# Patient Record
Sex: FEMALE | Race: WHITE | NOT HISPANIC OR LATINO | Employment: OTHER | URBAN - NONMETROPOLITAN AREA
[De-identification: names, ages, dates, MRNs, and addresses within clinical notes are randomized per-mention and may not be internally consistent; named-entity substitution may affect disease eponyms.]

---

## 2017-10-10 ENCOUNTER — OFFICE VISIT (OUTPATIENT)
Dept: OBSTETRICS AND GYNECOLOGY | Facility: CLINIC | Age: 72
End: 2017-10-10

## 2017-10-10 VITALS
BODY MASS INDEX: 27.99 KG/M2 | SYSTOLIC BLOOD PRESSURE: 132 MMHG | WEIGHT: 168 LBS | HEIGHT: 65 IN | DIASTOLIC BLOOD PRESSURE: 72 MMHG

## 2017-10-10 DIAGNOSIS — N36.8 URETHRAL PROLAPSE: Primary | ICD-10-CM

## 2017-10-10 DIAGNOSIS — Z85.828 HISTORY OF BASAL CELL CANCER: ICD-10-CM

## 2017-10-10 DIAGNOSIS — R10.2 VULVAR PAIN: ICD-10-CM

## 2017-10-10 PROCEDURE — 99214 OFFICE O/P EST MOD 30 MIN: CPT | Performed by: OBSTETRICS & GYNECOLOGY

## 2017-10-10 RX ORDER — ESTRADIOL 0.1 MG/G
CREAM VAGINAL
Qty: 1 EACH | Refills: 12 | Status: SHIPPED | OUTPATIENT
Start: 2017-10-10

## 2017-10-10 RX ORDER — AMLODIPINE BESYLATE 2.5 MG/1
TABLET ORAL
Refills: 3 | COMMUNITY
Start: 2017-08-10

## 2017-10-10 RX ORDER — FOLIC ACID 1 MG/1
TABLET ORAL
Refills: 1 | COMMUNITY
Start: 2017-09-01

## 2017-10-10 RX ORDER — PREDNISONE 1 MG/1
TABLET ORAL
Refills: 1 | COMMUNITY
Start: 2017-09-01

## 2017-10-10 RX ORDER — TRIAMTERENE AND HYDROCHLOROTHIAZIDE 37.5; 25 MG/1; MG/1
TABLET ORAL
Refills: 3 | COMMUNITY
Start: 2017-09-01

## 2017-10-10 NOTE — PROGRESS NOTES
"Subjective  Chief Complaint   Patient presents with   • Vaginal Pain     Patient advised has a growth in vaginal area, also having \"throbbing pain\" on pubic bone.      Patient is 72 y.o.  here for evaluation of abnormal growth noted in vaginal area.  Pt with history of basal cell ca of vulva; removed  from left labia.  Pt reports having recurrent itching in that area and has had throbbing pain in that area down to the bone.  Pt got mirror and examined herself and noticed growth.  Pt denies any vaginal bleeding, discharge.  Pt with no lesions elsewhere.  Pt denies any urinary symptoms.    History  Past Medical History:   Diagnosis Date   • Arthritis    • Asthma    • Basal cell carcinoma 2011    VULVA   • Heart disease    • History of Papanicolaou smear of cervix 2010    NORMAL    • Hypercholesteremia    • Hypertension    • Urinary incontinence      No current outpatient prescriptions on file prior to visit.     No current facility-administered medications on file prior to visit.      No Known Allergies  Past Surgical History:   Procedure Laterality Date   • VULVA SURGERY Left 2011    EXCISION OF LEFT LABIAL LESION/NODULE (DR AYLEEN AZAR)     Family History   Problem Relation Age of Onset   • Hypertension Father      Social History     Social History   • Marital status:      Spouse name: N/A   • Number of children: N/A   • Years of education: N/A     Social History Main Topics   • Smoking status: Never Smoker   • Smokeless tobacco: Never Used   • Alcohol use No   • Drug use: No   • Sexual activity: No      Comment:       Other Topics Concern   • None     Social History Narrative     Review of Systems  All systems were reviewed and negative except for:  Genitourinary: postivie for  vaginal pain     Objective  Vitals:    10/10/17 1407   BP: 132/72   Weight: 168 lb (76.2 kg)   Height: 65\" (165.1 cm)     Physical Exam:  General Appearance: alert, appears stated age and " cooperative  Head: normocephalic, without obvious abnormality and atraumatic  Eyes: lids and lashes normal, conjunctivae and sclerae normal, no icterus, no pallor, corneas clear and PERRLA  Ears: ears appear intact with no abnormalities noted  Nose: nares normal, septum midline, mucosa normal and no drainage  Neck: suppple, trachea midline and no thyromegaly  Lungs: clear to auscultation, respirations regular, respirations even and respirations unlabored  Heart: regular rhythm and normal rate, normal S1, S2, no murmur, gallop, or rubs and no click  Breasts: Not performed.  Abdomen: normal bowel sounds, no masses, no hepatomegaly, no splenomegaly, soft non-tender, no guarding and no rebound tenderness  Pelvic: Clinical staff was present for exam  External genitalia:  normal appearance of the external genitalia including Bartholin's and Brimson's glands. no skin lesions noted; +bilateral varicosities noted of vulva  :  urethral meatus normal; prolapse noted  Vaginal:  atrophic mucosal changes are present;  Cervix:  normal appearance.  Uterus:  normal size, shape and consistency.  Adnexa:  normal bimanual exam of the adnexa.  Extremities: moves extremities well, no edema, no cyanosis and no redness  Skin: no bleeding, bruising or rash and no lesions noted  Lymph Nodes: no palpable adenopathy  Psych: normal mood and affect, oriented to person, time and place, thought content organized and appropriate judgment    Lab Review   No data reviewed    Imaging   No data reviewed    Assessment/Plan    Problem List Items Addressed This Visit     None      Visit Diagnoses     Urethral prolapse    -  Primary  Patient with prolapse of urethra.  Options discussed with patient.  Rx estrace cream given as noted.      Relevant Medications    estradiol (ESTRACE VAGINAL) 0.1 MG/GM vaginal cream    History of basal cell cancer      No abnormalities noted however will schedule patient for vulvoscopy for further evaluation.    Relevant  Medications    methotrexate 2.5 MG tablet    Vulvar pain     Patient with vulvar pain with known vulvar varicosities.  No other abnormalities noted but will schedule for vulvoscopy as noted.             Follow up as discussed     This note was electronically signed.  Alana Salas M.D.

## 2017-10-25 ENCOUNTER — OFFICE VISIT (OUTPATIENT)
Dept: OBSTETRICS AND GYNECOLOGY | Facility: CLINIC | Age: 72
End: 2017-10-25

## 2017-10-25 VITALS
BODY MASS INDEX: 28.16 KG/M2 | SYSTOLIC BLOOD PRESSURE: 130 MMHG | DIASTOLIC BLOOD PRESSURE: 70 MMHG | WEIGHT: 169 LBS | HEIGHT: 65 IN

## 2017-10-25 DIAGNOSIS — Z85.828 HISTORY OF BASAL CELL CANCER: Primary | ICD-10-CM

## 2017-10-25 DIAGNOSIS — L29.2 VULVAR ITCHING: ICD-10-CM

## 2017-10-25 PROCEDURE — 56820 COLPOSCOPY VULVA: CPT | Performed by: OBSTETRICS & GYNECOLOGY

## 2017-10-25 NOTE — PROGRESS NOTES
Vulvoscopy    Date of procedure:  10/25/2017    Risks and benefits discussed? yes  All questions answered? yes  Consents given by the patient  Written consent obtained? yes    Pre-op indication: History of basal cell ca of vulva and vulvar itching/pain  Procedure documentation:    The patient was placed in the dorsal lithotomy position. The vulva was examined.  The area was washed with 3% acetic acid.  The colposcope was used to examine the vulva.  The following findings were noted:    The vulva  was able to be seen adequately.    Findings; No visible lesions    Vulvar biopsies not obtained..        Impression: 1. Adequate vulvoscopy       Plan: No abnormalities noted today.  Instructions and precautions given.  Pt to call if any further symptoms or problems.      This note was electronically signed.  Alana Salas M.D.

## 2018-09-21 ENCOUNTER — LAB (OUTPATIENT)
Dept: LAB | Facility: HOSPITAL | Age: 73
End: 2018-09-21

## 2018-09-21 ENCOUNTER — TRANSCRIBE ORDERS (OUTPATIENT)
Dept: LAB | Facility: HOSPITAL | Age: 73
End: 2018-09-21

## 2018-09-21 DIAGNOSIS — E16.2 AUTOIMMUNE HYPOGLYCEMIA: ICD-10-CM

## 2018-09-21 DIAGNOSIS — E16.2 AUTOIMMUNE HYPOGLYCEMIA: Primary | ICD-10-CM

## 2018-09-21 DIAGNOSIS — I10 ESSENTIAL HYPERTENSION, MALIGNANT: ICD-10-CM

## 2018-09-21 LAB
ALBUMIN SERPL-MCNC: 4.25 G/DL (ref 3.2–4.8)
ALBUMIN/GLOB SERPL: 2.2 G/DL (ref 1.5–2.5)
ALP SERPL-CCNC: 58 U/L (ref 25–100)
ALT SERPL W P-5'-P-CCNC: 14 U/L (ref 7–40)
ANION GAP SERPL CALCULATED.3IONS-SCNC: 3 MMOL/L (ref 3–11)
ARTICHOKE IGE QN: 97 MG/DL (ref 0–130)
AST SERPL-CCNC: 26 U/L (ref 0–33)
BILIRUB SERPL-MCNC: 1 MG/DL (ref 0.3–1.2)
BUN BLD-MCNC: 38 MG/DL (ref 9–23)
BUN/CREAT SERPL: 33.3 (ref 7–25)
CALCIUM SPEC-SCNC: 9.2 MG/DL (ref 8.7–10.4)
CHLORIDE SERPL-SCNC: 109 MMOL/L (ref 99–109)
CHOLEST SERPL-MCNC: 203 MG/DL (ref 0–200)
CO2 SERPL-SCNC: 28 MMOL/L (ref 20–31)
CREAT BLD-MCNC: 1.14 MG/DL (ref 0.6–1.3)
DEPRECATED RDW RBC AUTO: 47.3 FL (ref 37–54)
ERYTHROCYTE [DISTWIDTH] IN BLOOD BY AUTOMATED COUNT: 13.6 % (ref 11.3–14.5)
GFR SERPL CREATININE-BSD FRML MDRD: 46 ML/MIN/1.73
GLOBULIN UR ELPH-MCNC: 2 GM/DL
GLUCOSE BLD-MCNC: 98 MG/DL (ref 70–100)
HBA1C MFR BLD: 5.8 % (ref 4.8–5.6)
HCT VFR BLD AUTO: 38.8 % (ref 34.5–44)
HDLC SERPL-MCNC: 81 MG/DL (ref 40–60)
HGB BLD-MCNC: 12.4 G/DL (ref 11.5–15.5)
MCH RBC QN AUTO: 30.6 PG (ref 27–31)
MCHC RBC AUTO-ENTMCNC: 32 G/DL (ref 32–36)
MCV RBC AUTO: 95.8 FL (ref 80–99)
PLATELET # BLD AUTO: 265 10*3/MM3 (ref 150–450)
PMV BLD AUTO: 10.8 FL (ref 6–12)
POTASSIUM BLD-SCNC: 3.9 MMOL/L (ref 3.5–5.5)
PROT SERPL-MCNC: 6.2 G/DL (ref 5.7–8.2)
RBC # BLD AUTO: 4.05 10*6/MM3 (ref 3.89–5.14)
SODIUM BLD-SCNC: 140 MMOL/L (ref 132–146)
T3RU NFR SERPL: 33.1 % (ref 23–37)
T4 FREE SERPL-MCNC: 0.89 NG/DL (ref 0.89–1.76)
T4 SERPL-MCNC: 7.6 MCG/DL (ref 4.7–11.4)
TRIGL SERPL-MCNC: 102 MG/DL (ref 0–150)
TSH SERPL DL<=0.05 MIU/L-ACNC: 2.35 MIU/ML (ref 0.35–5.35)
WBC NRBC COR # BLD: 9.85 10*3/MM3 (ref 3.5–10.8)

## 2018-09-21 PROCEDURE — 84443 ASSAY THYROID STIM HORMONE: CPT

## 2018-09-21 PROCEDURE — 84681 ASSAY OF C-PEPTIDE: CPT

## 2018-09-21 PROCEDURE — 83036 HEMOGLOBIN GLYCOSYLATED A1C: CPT

## 2018-09-21 PROCEDURE — 36415 COLL VENOUS BLD VENIPUNCTURE: CPT

## 2018-09-21 PROCEDURE — 84206 ASSAY OF PROINSULIN: CPT

## 2018-09-21 PROCEDURE — 84439 ASSAY OF FREE THYROXINE: CPT

## 2018-09-21 PROCEDURE — 85027 COMPLETE CBC AUTOMATED: CPT

## 2018-09-21 PROCEDURE — 80053 COMPREHEN METABOLIC PANEL: CPT

## 2018-09-21 PROCEDURE — 84479 ASSAY OF THYROID (T3 OR T4): CPT

## 2018-09-21 PROCEDURE — 83525 ASSAY OF INSULIN: CPT

## 2018-09-21 PROCEDURE — 82384 ASSAY THREE CATECHOLAMINES: CPT

## 2018-09-21 PROCEDURE — 80061 LIPID PANEL: CPT

## 2018-09-22 LAB — C PEPTIDE SERPL-MCNC: 2.4 NG/ML (ref 1.1–4.4)

## 2018-09-23 LAB — INSULIN SERPL-ACNC: 3.9 UIU/ML

## 2018-09-24 LAB — PROINSULIN SERPL-SCNC: 4.5 PMOL/L (ref 0–10)

## 2018-10-05 ENCOUNTER — TRANSCRIBE ORDERS (OUTPATIENT)
Dept: ADMINISTRATIVE | Facility: HOSPITAL | Age: 73
End: 2018-10-05

## 2018-10-05 ENCOUNTER — LAB (OUTPATIENT)
Dept: LAB | Facility: HOSPITAL | Age: 73
End: 2018-10-05

## 2018-10-05 DIAGNOSIS — E16.2 HYPOGLYCEMIA, UNSPECIFIED: ICD-10-CM

## 2018-10-05 DIAGNOSIS — I10 ESSENTIAL HYPERTENSION, MALIGNANT: ICD-10-CM

## 2018-10-05 DIAGNOSIS — E16.2 HYPOGLYCEMIA, UNSPECIFIED: Primary | ICD-10-CM

## 2018-10-05 PROCEDURE — 82384 ASSAY THREE CATECHOLAMINES: CPT

## 2018-10-05 PROCEDURE — 36415 COLL VENOUS BLD VENIPUNCTURE: CPT

## 2018-10-10 LAB
DOPAMINE SERPL-MCNC: 31 PG/ML (ref 0–48)
EPINEPH PLAS-MCNC: <15 PG/ML (ref 0–62)
NOREPINEPH PLAS-MCNC: 327 PG/ML (ref 0–874)

## 2019-03-20 ENCOUNTER — TRANSCRIBE ORDERS (OUTPATIENT)
Dept: CT IMAGING | Facility: HOSPITAL | Age: 74
End: 2019-03-20

## 2019-03-20 DIAGNOSIS — D89.9 DISORDER INVOLVING THE IMMUNE MECHANISM (HCC): Primary | ICD-10-CM

## 2019-03-20 DIAGNOSIS — R61 GENERALIZED HYPERHIDROSIS: ICD-10-CM

## 2019-03-28 ENCOUNTER — HOSPITAL ENCOUNTER (OUTPATIENT)
Dept: CT IMAGING | Facility: HOSPITAL | Age: 74
Discharge: HOME | End: 2019-03-28
Admitting: INTERNAL MEDICINE

## 2019-03-28 ENCOUNTER — HOSPITAL ENCOUNTER (OUTPATIENT)
Dept: CT IMAGING | Facility: HOSPITAL | Age: 74
Discharge: HOME | End: 2019-03-28

## 2019-03-28 DIAGNOSIS — R61 GENERALIZED HYPERHIDROSIS: ICD-10-CM

## 2019-03-28 DIAGNOSIS — D89.9 DISORDER INVOLVING THE IMMUNE MECHANISM (HCC): ICD-10-CM

## 2019-03-28 LAB — CREAT BLDA-MCNC: 1.1 MG/DL (ref 0.6–1.3)

## 2019-03-28 PROCEDURE — 74178 CT ABD&PLV WO CNTR FLWD CNTR: CPT

## 2019-03-28 PROCEDURE — 0 IOPAMIDOL PER 1 ML: Performed by: INTERNAL MEDICINE

## 2019-03-28 PROCEDURE — 71260 CT THORAX DX C+: CPT

## 2019-03-28 PROCEDURE — A9270 NON-COVERED ITEM OR SERVICE: HCPCS | Performed by: INTERNAL MEDICINE

## 2019-03-28 PROCEDURE — 63710000001 BARIUM 2 % SUSPENSION: Performed by: INTERNAL MEDICINE

## 2019-03-28 PROCEDURE — 82565 ASSAY OF CREATININE: CPT

## 2019-03-28 RX ADMIN — IOPAMIDOL 100 ML: 510 INJECTION, SOLUTION INTRAVASCULAR at 11:50

## 2019-03-28 RX ADMIN — BARIUM SULFATE 450 ML: 21 SUSPENSION ORAL at 11:50
